# Patient Record
Sex: MALE | Race: WHITE | NOT HISPANIC OR LATINO
[De-identification: names, ages, dates, MRNs, and addresses within clinical notes are randomized per-mention and may not be internally consistent; named-entity substitution may affect disease eponyms.]

---

## 2017-02-17 ENCOUNTER — RX ONLY (RX ONLY)
Age: 32
End: 2017-02-17

## 2017-02-17 ENCOUNTER — APPOINTMENT (OUTPATIENT)
Age: 32
Setting detail: DERMATOLOGY
End: 2017-02-17

## 2017-02-17 DIAGNOSIS — L738 OTHER SPECIFIED DISEASES OF HAIR AND HAIR FOLLICLES: ICD-10-CM

## 2017-02-17 DIAGNOSIS — L70.0 ACNE VULGARIS: ICD-10-CM

## 2017-02-17 DIAGNOSIS — L663 OTHER SPECIFIED DISEASES OF HAIR AND HAIR FOLLICLES: ICD-10-CM

## 2017-02-17 PROBLEM — L02.12 FURUNCLE OF NECK: Status: ACTIVE | Noted: 2017-02-17

## 2017-02-17 PROBLEM — L02.02 FURUNCLE OF FACE: Status: ACTIVE | Noted: 2017-02-17

## 2017-02-17 PROCEDURE — OTHER COUNSELING: OTHER

## 2017-02-17 PROCEDURE — OTHER PRESCRIPTION: OTHER

## 2017-02-17 PROCEDURE — 99202 OFFICE O/P NEW SF 15 MIN: CPT

## 2017-02-17 RX ORDER — CLINDAMYCIN PHOSPHATE 10 MG/ML
LOTION TOPICAL QD
Qty: 1 | Refills: 1 | Status: ERX

## 2017-02-17 RX ORDER — DOXYCYCLINE HYCLATE 100 MG/1
1 TABLET CAPSULE, GELATIN COATED ORAL DAILY
Qty: 30 | Refills: 0 | Status: ERX

## 2017-02-17 ASSESSMENT — LOCATION DETAILED DESCRIPTION DERM
LOCATION DETAILED: RIGHT INFERIOR LATERAL NECK
LOCATION DETAILED: LEFT INFERIOR CENTRAL MALAR CHEEK
LOCATION DETAILED: SUBMENTAL CHIN
LOCATION DETAILED: LEFT INFERIOR LATERAL NECK
LOCATION DETAILED: RIGHT CHIN
LOCATION DETAILED: RIGHT SUPERIOR ANTERIOR NECK
LOCATION DETAILED: RIGHT INFERIOR CENTRAL MALAR CHEEK

## 2017-02-17 ASSESSMENT — LOCATION SIMPLE DESCRIPTION DERM
LOCATION SIMPLE: LEFT CHEEK
LOCATION SIMPLE: SUBMENTAL CHIN
LOCATION SIMPLE: RIGHT ANTERIOR NECK
LOCATION SIMPLE: RIGHT CHEEK
LOCATION SIMPLE: CHIN
LOCATION SIMPLE: LEFT ANTERIOR NECK

## 2017-02-17 ASSESSMENT — LOCATION ZONE DERM
LOCATION ZONE: NECK
LOCATION ZONE: FACE

## 2017-02-17 NOTE — HPI: BUMPS
How Severe Are Your Bumps?: mild
Have Your Bumps Been Treated?: not been treated
Is This A New Presentation, Or A Follow-Up?: Bumps
Additional History: Patient is not currently shaving or using a facial cleanser due to irritation.

## 2017-03-17 ENCOUNTER — APPOINTMENT (OUTPATIENT)
Age: 32
Setting detail: DERMATOLOGY
End: 2017-03-17

## 2017-03-17 DIAGNOSIS — L738 OTHER SPECIFIED DISEASES OF HAIR AND HAIR FOLLICLES: ICD-10-CM

## 2017-03-17 DIAGNOSIS — L70.0 ACNE VULGARIS: ICD-10-CM

## 2017-03-17 DIAGNOSIS — L81.0 POSTINFLAMMATORY HYPERPIGMENTATION: ICD-10-CM

## 2017-03-17 DIAGNOSIS — L663 OTHER SPECIFIED DISEASES OF HAIR AND HAIR FOLLICLES: ICD-10-CM

## 2017-03-17 PROBLEM — L02.12 FURUNCLE OF NECK: Status: ACTIVE | Noted: 2017-03-17

## 2017-03-17 PROBLEM — L02.02 FURUNCLE OF FACE: Status: ACTIVE | Noted: 2017-03-17

## 2017-03-17 PROCEDURE — OTHER PRESCRIPTION: OTHER

## 2017-03-17 PROCEDURE — OTHER PRESCRIPTION MEDICATION MANAGEMENT: OTHER

## 2017-03-17 PROCEDURE — 99213 OFFICE O/P EST LOW 20 MIN: CPT

## 2017-03-17 PROCEDURE — OTHER COUNSELING: OTHER

## 2017-03-17 RX ORDER — CLINDAMYCIN PHOSPHATE 10 MG/ML
LOTION TOPICAL QD
Qty: 1 | Refills: 5 | Status: ERX

## 2017-03-17 RX ORDER — DOXYCYCLINE HYCLATE 100 MG/1
1 TABLET CAPSULE, GELATIN COATED ORAL DAILY
Qty: 30 | Refills: 0 | Status: ERX

## 2017-03-17 ASSESSMENT — LOCATION SIMPLE DESCRIPTION DERM
LOCATION SIMPLE: RIGHT CHEEK
LOCATION SIMPLE: RIGHT ANTERIOR NECK
LOCATION SIMPLE: LEFT ANTERIOR NECK
LOCATION SIMPLE: LEFT CHEEK
LOCATION SIMPLE: SUBMENTAL CHIN
LOCATION SIMPLE: CHIN

## 2017-03-17 ASSESSMENT — LOCATION DETAILED DESCRIPTION DERM
LOCATION DETAILED: LEFT INFERIOR CENTRAL MALAR CHEEK
LOCATION DETAILED: RIGHT SUPERIOR ANTERIOR NECK
LOCATION DETAILED: SUBMENTAL CHIN
LOCATION DETAILED: RIGHT INFERIOR LATERAL NECK
LOCATION DETAILED: LEFT INFERIOR LATERAL NECK
LOCATION DETAILED: RIGHT INFERIOR CENTRAL MALAR CHEEK
LOCATION DETAILED: RIGHT CHIN

## 2017-03-17 ASSESSMENT — LOCATION ZONE DERM
LOCATION ZONE: FACE
LOCATION ZONE: NECK

## 2017-03-17 NOTE — PROCEDURE: PRESCRIPTION MEDICATION MANAGEMENT
Continue Regimen: Doxycycline x 1 more month then discontinue\\nClindamycin lotion daily
Otc Regimen: Head and shoulders in beard\\nRecommend gentle moisturizer in beard twice daily
Detail Level: Zone

## 2017-12-29 ENCOUNTER — APPOINTMENT (OUTPATIENT)
Age: 32
Setting detail: DERMATOLOGY
End: 2018-01-05

## 2017-12-29 ENCOUNTER — RX ONLY (RX ONLY)
Age: 32
End: 2017-12-29

## 2017-12-29 DIAGNOSIS — L738 OTHER SPECIFIED DISEASES OF HAIR AND HAIR FOLLICLES: ICD-10-CM

## 2017-12-29 DIAGNOSIS — L663 OTHER SPECIFIED DISEASES OF HAIR AND HAIR FOLLICLES: ICD-10-CM

## 2017-12-29 PROBLEM — L02.02 FURUNCLE OF FACE: Status: ACTIVE | Noted: 2017-12-29

## 2017-12-29 PROBLEM — L02.12 FURUNCLE OF NECK: Status: ACTIVE | Noted: 2017-12-29

## 2017-12-29 PROCEDURE — OTHER COUNSELING: OTHER

## 2017-12-29 PROCEDURE — OTHER PRESCRIPTION MEDICATION MANAGEMENT: OTHER

## 2017-12-29 PROCEDURE — OTHER PRESCRIPTION: OTHER

## 2017-12-29 PROCEDURE — 99213 OFFICE O/P EST LOW 20 MIN: CPT

## 2017-12-29 RX ORDER — CLINDAMYCIN PHOSPHATE 10 MG/ML
LOTION TOPICAL QD
Qty: 1 | Refills: 5 | Status: ERX

## 2017-12-29 RX ORDER — DOXYCYCLINE HYCLATE 100 MG/1
TABLET, COATED ORAL
Qty: 60 | Refills: 0 | Status: ERX

## 2017-12-29 ASSESSMENT — LOCATION ZONE DERM
LOCATION ZONE: FACE
LOCATION ZONE: NECK

## 2017-12-29 ASSESSMENT — LOCATION SIMPLE DESCRIPTION DERM
LOCATION SIMPLE: CHIN
LOCATION SIMPLE: LEFT ANTERIOR NECK
LOCATION SIMPLE: LEFT CHEEK
LOCATION SIMPLE: SUBMENTAL CHIN
LOCATION SIMPLE: RIGHT CHEEK
LOCATION SIMPLE: RIGHT ANTERIOR NECK

## 2017-12-29 ASSESSMENT — LOCATION DETAILED DESCRIPTION DERM
LOCATION DETAILED: RIGHT INFERIOR CENTRAL MALAR CHEEK
LOCATION DETAILED: RIGHT SUPERIOR ANTERIOR NECK
LOCATION DETAILED: SUBMENTAL CHIN
LOCATION DETAILED: LEFT INFERIOR LATERAL NECK
LOCATION DETAILED: RIGHT INFERIOR LATERAL NECK
LOCATION DETAILED: LEFT INFERIOR CENTRAL MALAR CHEEK
LOCATION DETAILED: RIGHT CHIN

## 2017-12-29 NOTE — PROCEDURE: PRESCRIPTION MEDICATION MANAGEMENT
Initiate Treatment: Doxycycline 100 mgs twice daily
Continue Regimen: Clindamycin lotion
Detail Level: Simple
Otc Regimen: BPO wash

## 2018-02-02 ENCOUNTER — APPOINTMENT (OUTPATIENT)
Age: 33
Setting detail: DERMATOLOGY
End: 2018-02-06

## 2018-02-02 DIAGNOSIS — L738 OTHER SPECIFIED DISEASES OF HAIR AND HAIR FOLLICLES: ICD-10-CM

## 2018-02-02 DIAGNOSIS — L663 OTHER SPECIFIED DISEASES OF HAIR AND HAIR FOLLICLES: ICD-10-CM

## 2018-02-02 PROBLEM — L02.02 FURUNCLE OF FACE: Status: ACTIVE | Noted: 2018-02-02

## 2018-02-02 PROBLEM — L02.12 FURUNCLE OF NECK: Status: ACTIVE | Noted: 2018-02-02

## 2018-02-02 PROCEDURE — OTHER COUNSELING: OTHER

## 2018-02-02 PROCEDURE — OTHER PRESCRIPTION MEDICATION MANAGEMENT: OTHER

## 2018-02-02 PROCEDURE — OTHER PRESCRIPTION: OTHER

## 2018-02-02 PROCEDURE — 99213 OFFICE O/P EST LOW 20 MIN: CPT

## 2018-02-02 RX ORDER — DOXYCYCLINE HYCLATE 100 MG/1
TABLET, COATED ORAL
Qty: 60 | Refills: 1 | Status: ERX

## 2018-02-02 ASSESSMENT — LOCATION ZONE DERM
LOCATION ZONE: NECK
LOCATION ZONE: FACE

## 2018-02-02 ASSESSMENT — LOCATION SIMPLE DESCRIPTION DERM
LOCATION SIMPLE: SUBMENTAL CHIN
LOCATION SIMPLE: LEFT ANTERIOR NECK
LOCATION SIMPLE: RIGHT CHEEK
LOCATION SIMPLE: LEFT CHEEK
LOCATION SIMPLE: RIGHT ANTERIOR NECK
LOCATION SIMPLE: CHIN

## 2018-02-02 ASSESSMENT — LOCATION DETAILED DESCRIPTION DERM
LOCATION DETAILED: RIGHT SUPERIOR ANTERIOR NECK
LOCATION DETAILED: RIGHT INFERIOR CENTRAL MALAR CHEEK
LOCATION DETAILED: SUBMENTAL CHIN
LOCATION DETAILED: LEFT INFERIOR CENTRAL MALAR CHEEK
LOCATION DETAILED: LEFT INFERIOR LATERAL NECK
LOCATION DETAILED: RIGHT INFERIOR LATERAL NECK
LOCATION DETAILED: RIGHT CHIN

## 2018-02-02 NOTE — PROCEDURE: PRESCRIPTION MEDICATION MANAGEMENT
Continue Regimen: Doxycycline 100 mg BID\\nClindamycin lotion
Plan: 2 more months of doxycycline \\n3 month f/u\\nContinue clindamycin lotion daily
Detail Level: Simple

## 2018-10-01 ENCOUNTER — APPOINTMENT (OUTPATIENT)
Age: 33
Setting detail: DERMATOLOGY
End: 2018-10-04

## 2018-10-01 DIAGNOSIS — L663 OTHER SPECIFIED DISEASES OF HAIR AND HAIR FOLLICLES: ICD-10-CM

## 2018-10-01 DIAGNOSIS — L738 OTHER SPECIFIED DISEASES OF HAIR AND HAIR FOLLICLES: ICD-10-CM

## 2018-10-01 DIAGNOSIS — B35.0 TINEA BARBAE AND TINEA CAPITIS: ICD-10-CM

## 2018-10-01 PROBLEM — L02.12 FURUNCLE OF NECK: Status: ACTIVE | Noted: 2018-10-01

## 2018-10-01 PROBLEM — L02.02 FURUNCLE OF FACE: Status: ACTIVE | Noted: 2018-10-01

## 2018-10-01 PROCEDURE — OTHER COUNSELING: OTHER

## 2018-10-01 PROCEDURE — OTHER PRESCRIPTION: OTHER

## 2018-10-01 PROCEDURE — OTHER ADDITIONAL NOTES: OTHER

## 2018-10-01 PROCEDURE — 99213 OFFICE O/P EST LOW 20 MIN: CPT

## 2018-10-01 RX ORDER — KETOCONAZOLE 20 MG/ML
SHAMPOO TOPICAL
Qty: 1 | Refills: 0 | Status: ERX

## 2018-10-01 RX ORDER — CLINDAMYCIN PHOSPHATE 10 MG/ML
LOTION TOPICAL
Qty: 1 | Refills: 3 | Status: ERX

## 2018-10-01 ASSESSMENT — LOCATION DETAILED DESCRIPTION DERM
LOCATION DETAILED: LEFT SUPERIOR LATERAL NECK
LOCATION DETAILED: RIGHT SUPERIOR ANTERIOR NECK
LOCATION DETAILED: LEFT SUPERIOR MEDIAL BUCCAL CHEEK
LOCATION DETAILED: LEFT INFERIOR CENTRAL MALAR CHEEK
LOCATION DETAILED: RIGHT SUPERIOR LATERAL BUCCAL CHEEK
LOCATION DETAILED: LEFT INFERIOR LATERAL NECK
LOCATION DETAILED: RIGHT CHIN
LOCATION DETAILED: RIGHT INFERIOR CENTRAL MALAR CHEEK
LOCATION DETAILED: SUBMENTAL CHIN
LOCATION DETAILED: RIGHT INFERIOR LATERAL NECK

## 2018-10-01 ASSESSMENT — LOCATION SIMPLE DESCRIPTION DERM
LOCATION SIMPLE: LEFT ANTERIOR NECK
LOCATION SIMPLE: CHIN
LOCATION SIMPLE: SUBMENTAL CHIN
LOCATION SIMPLE: LEFT CHEEK
LOCATION SIMPLE: RIGHT ANTERIOR NECK
LOCATION SIMPLE: RIGHT CHEEK

## 2018-10-01 ASSESSMENT — LOCATION ZONE DERM
LOCATION ZONE: NECK
LOCATION ZONE: FACE

## 2018-10-01 NOTE — PROCEDURE: ADDITIONAL NOTES
Detail Level: Simple
Additional Notes: possible tinea due to ongoing nature of inflammation and pustules. temporarily resolves with oral antibiotics and topical antibiotics. unable to tolerate BPO or retinoids.\\nobtained fungal culture today and consider treatment with oral antifungal at follow-up if culture postive

## 2018-10-31 ENCOUNTER — APPOINTMENT (OUTPATIENT)
Age: 33
Setting detail: DERMATOLOGY
End: 2018-11-01

## 2018-10-31 DIAGNOSIS — L663 OTHER SPECIFIED DISEASES OF HAIR AND HAIR FOLLICLES: ICD-10-CM

## 2018-10-31 DIAGNOSIS — L738 OTHER SPECIFIED DISEASES OF HAIR AND HAIR FOLLICLES: ICD-10-CM

## 2018-10-31 PROBLEM — L02.12 FURUNCLE OF NECK: Status: ACTIVE | Noted: 2018-10-31

## 2018-10-31 PROBLEM — L02.02 FURUNCLE OF FACE: Status: ACTIVE | Noted: 2018-10-31

## 2018-10-31 PROCEDURE — OTHER PRESCRIPTION: OTHER

## 2018-10-31 PROCEDURE — OTHER COUNSELING: OTHER

## 2018-10-31 PROCEDURE — OTHER PRESCRIPTION MEDICATION MANAGEMENT: OTHER

## 2018-10-31 PROCEDURE — 99213 OFFICE O/P EST LOW 20 MIN: CPT

## 2018-10-31 RX ORDER — KETOCONAZOLE 20.5 MG/ML
SHAMPOO, SUSPENSION TOPICAL
Qty: 1 | Refills: 2 | Status: ERX

## 2018-10-31 RX ORDER — DOXYCYCLINE HYCLATE 50 MG/1
CAPSULE, GELATIN COATED ORAL
Qty: 60 | Refills: 0 | Status: ERX

## 2018-10-31 ASSESSMENT — LOCATION DETAILED DESCRIPTION DERM
LOCATION DETAILED: LEFT SUPERIOR LATERAL BUCCAL CHEEK
LOCATION DETAILED: RIGHT SUPERIOR LATERAL BUCCAL CHEEK
LOCATION DETAILED: LEFT SUPERIOR ANTERIOR NECK

## 2018-10-31 ASSESSMENT — LOCATION ZONE DERM
LOCATION ZONE: FACE
LOCATION ZONE: NECK

## 2018-10-31 ASSESSMENT — LOCATION SIMPLE DESCRIPTION DERM
LOCATION SIMPLE: LEFT CHEEK
LOCATION SIMPLE: RIGHT CHEEK
LOCATION SIMPLE: LEFT ANTERIOR NECK

## 2018-12-12 ENCOUNTER — APPOINTMENT (OUTPATIENT)
Age: 33
Setting detail: DERMATOLOGY
End: 2018-12-15

## 2018-12-12 DIAGNOSIS — L70.0 ACNE VULGARIS: ICD-10-CM

## 2018-12-12 DIAGNOSIS — L738 OTHER SPECIFIED DISEASES OF HAIR AND HAIR FOLLICLES: ICD-10-CM

## 2018-12-12 DIAGNOSIS — L663 OTHER SPECIFIED DISEASES OF HAIR AND HAIR FOLLICLES: ICD-10-CM

## 2018-12-12 PROBLEM — L02.02 FURUNCLE OF FACE: Status: ACTIVE | Noted: 2018-12-12

## 2018-12-12 PROBLEM — L02.12 FURUNCLE OF NECK: Status: ACTIVE | Noted: 2018-12-12

## 2018-12-12 PROCEDURE — 99213 OFFICE O/P EST LOW 20 MIN: CPT

## 2018-12-12 PROCEDURE — OTHER PRESCRIPTION MEDICATION MANAGEMENT: OTHER

## 2018-12-12 PROCEDURE — OTHER PRESCRIPTION: OTHER

## 2018-12-12 PROCEDURE — OTHER COUNSELING: OTHER

## 2018-12-12 RX ORDER — ADAPALENE AND BENZOYL PEROXIDE 3; 25 MG/G; MG/G
GEL TOPICAL
Qty: 1 | Refills: 3 | Status: ERX

## 2018-12-12 ASSESSMENT — LOCATION DETAILED DESCRIPTION DERM
LOCATION DETAILED: LEFT SUPERIOR ANTERIOR NECK
LOCATION DETAILED: LEFT SUPERIOR LATERAL BUCCAL CHEEK
LOCATION DETAILED: RIGHT SUPERIOR LATERAL BUCCAL CHEEK

## 2018-12-12 ASSESSMENT — LOCATION SIMPLE DESCRIPTION DERM
LOCATION SIMPLE: LEFT ANTERIOR NECK
LOCATION SIMPLE: LEFT CHEEK
LOCATION SIMPLE: RIGHT CHEEK

## 2018-12-12 ASSESSMENT — LOCATION ZONE DERM
LOCATION ZONE: NECK
LOCATION ZONE: FACE

## 2018-12-12 NOTE — PROCEDURE: COUNSELING
Birth Control Pills Counseling: Birth Control Pill Counseling: I discussed with the patient the potential side effects of OCPs including but not limited to increased risk of stroke, heart attack, thrombophlebitis, deep venous thrombosis, hepatic adenomas, breast changes, GI upset, headaches, and depression.  The patient verbalized understanding of the proper use and possible adverse effects of OCPs. All of the patient's questions and concerns were addressed.
Bactrim Counseling:  I discussed with the patient the risks of sulfa antibiotics including but not limited to GI upset, allergic reaction, drug rash, diarrhea, dizziness, photosensitivity, and yeast infections.  Rarely, more serious reactions can occur including but not limited to aplastic anemia, agranulocytosis, methemoglobinemia, blood dyscrasias, liver or kidney failure, lung infiltrates or desquamative/blistering drug rashes.
Doxycycline Counseling:  Patient counseled regarding possible photosensitivity and increased risk for sunburn.  Patient instructed to avoid sunlight, if possible.  When exposed to sunlight, patients should wear protective clothing, sunglasses, and sunscreen.  The patient was instructed to call the office immediately if the following severe adverse effects occur:  hearing changes, easy bruising/bleeding, severe headache, or vision changes.  The patient verbalized understanding of the proper use and possible adverse effects of doxycycline.  All of the patient's questions and concerns were addressed.
High Dose Vitamin A Counseling: Side effects reviewed, pt to contact office should one occur.
Birth Control Pills Pregnancy And Lactation Text: This medication should be avoided if pregnant and for the first 30 days post-partum.
Detail Level: Zone
Topical Retinoid counseling:  Patient advised to apply a pea-sized amount only at bedtime and wait 30 minutes after washing their face before applying.  If too drying, patient may add a non-comedogenic moisturizer. The patient verbalized understanding of the proper use and possible adverse effects of retinoids.  All of the patient's questions and concerns were addressed.
Use Enhanced Medication Counseling?: No
Topical Clindamycin Pregnancy And Lactation Text: This medication is Pregnancy Category B and is considered safe during pregnancy. It is unknown if it is excreted in breast milk.
Spironolactone Pregnancy And Lactation Text: This medication can cause feminization of the male fetus and should be avoided during pregnancy. The active metabolite is also found in breast milk.
Dapsone Pregnancy And Lactation Text: This medication is Pregnancy Category C and is not considered safe during pregnancy or breast feeding.
Doxycycline Pregnancy And Lactation Text: This medication is Pregnancy Category D and not consider safe during pregnancy. It is also excreted in breast milk but is considered safe for shorter treatment courses.
Minocycline Counseling: Patient advised regarding possible photosensitivity and discoloration of the teeth, skin, lips, tongue and gums.  Patient instructed to avoid sunlight, if possible.  When exposed to sunlight, patients should wear protective clothing, sunglasses, and sunscreen.  The patient was instructed to call the office immediately if the following severe adverse effects occur:  hearing changes, easy bruising/bleeding, severe headache, or vision changes.  The patient verbalized understanding of the proper use and possible adverse effects of minocycline.  All of the patient's questions and concerns were addressed.
Dapsone Counseling: I discussed with the patient the risks of dapsone including but not limited to hemolytic anemia, agranulocytosis, rashes, methemoglobinemia, kidney failure, peripheral neuropathy, headaches, GI upset, and liver toxicity.  Patients who start dapsone require monitoring including baseline LFTs and weekly CBCs for the first month, then every month thereafter.  The patient verbalized understanding of the proper use and possible adverse effects of dapsone.  All of the patient's questions and concerns were addressed.
Tetracycline Pregnancy And Lactation Text: This medication is Pregnancy Category D and not consider safe during pregnancy. It is also excreted in breast milk.
Erythromycin Pregnancy And Lactation Text: This medication is Pregnancy Category B and is considered safe during pregnancy. It is also excreted in breast milk.
Spironolactone Counseling: Patient advised regarding risks of diarrhea, abdominal pain, hyperkalemia, birth defects (for female patients), liver toxicity and renal toxicity. The patient may need blood work to monitor liver and kidney function and potassium levels while on therapy. The patient verbalized understanding of the proper use and possible adverse effects of spironolactone.  All of the patient's questions and concerns were addressed.
Topical Retinoid Pregnancy And Lactation Text: This medication is Pregnancy Category C. It is unknown if this medication is excreted in breast milk.
Azithromycin Pregnancy And Lactation Text: This medication is considered safe during pregnancy and is also secreted in breast milk.
Tazorac Counseling:  Patient advised that medication is irritating and drying.  Patient may need to apply sparingly and wash off after an hour before eventually leaving it on overnight.  The patient verbalized understanding of the proper use and possible adverse effects of tazorac.  All of the patient's questions and concerns were addressed.
Azithromycin Counseling:  I discussed with the patient the risks of azithromycin including but not limited to GI upset, allergic reaction, drug rash, diarrhea, and yeast infections.
Bactrim Pregnancy And Lactation Text: This medication is Pregnancy Category D and is known to cause fetal risk.  It is also excreted in breast milk.
Erythromycin Counseling:  I discussed with the patient the risks of erythromycin including but not limited to GI upset, allergic reaction, drug rash, diarrhea, increase in liver enzymes, and yeast infections.
Topical Clindamycin Counseling: Patient counseled that this medication may cause skin irritation or allergic reactions.  In the event of skin irritation, the patient was advised to reduce the amount of the drug applied or use it less frequently.   The patient verbalized understanding of the proper use and possible adverse effects of clindamycin.  All of the patient's questions and concerns were addressed.
Topical Sulfur Applications Counseling: Topical Sulfur Counseling: Patient counseled that this medication may cause skin irritation or allergic reactions.  In the event of skin irritation, the patient was advised to reduce the amount of the drug applied or use it less frequently.   The patient verbalized understanding of the proper use and possible adverse effects of topical sulfur application.  All of the patient's questions and concerns were addressed.
Benzoyl Peroxide Counseling: Patient counseled that medicine may cause skin irritation and bleach clothing.  In the event of skin irritation, the patient was advised to reduce the amount of the drug applied or use it less frequently.   The patient verbalized understanding of the proper use and possible adverse effects of benzoyl peroxide.  All of the patient's questions and concerns were addressed.
Benzoyl Peroxide Pregnancy And Lactation Text: This medication is Pregnancy Category C. It is unknown if benzoyl peroxide is excreted in breast milk.
Topical Sulfur Applications Pregnancy And Lactation Text: This medication is Pregnancy Category C and has an unknown safety profile during pregnancy. It is unknown if this topical medication is excreted in breast milk.
High Dose Vitamin A Pregnancy And Lactation Text: High dose vitamin A therapy is contraindicated during pregnancy and breast feeding.
Tetracycline Counseling: Patient counseled regarding possible photosensitivity and increased risk for sunburn.  Patient instructed to avoid sunlight, if possible.  When exposed to sunlight, patients should wear protective clothing, sunglasses, and sunscreen.  The patient was instructed to call the office immediately if the following severe adverse effects occur:  hearing changes, easy bruising/bleeding, severe headache, or vision changes.  The patient verbalized understanding of the proper use and possible adverse effects of tetracycline.  All of the patient's questions and concerns were addressed. Patient understands to avoid pregnancy while on therapy due to potential birth defects.
Isotretinoin Counseling: Patient should get monthly blood tests, not donate blood, not drive at night if vision affected, not share medication, and not undergo elective surgery for 6 months after tx completed. Side effects reviewed, pt to contact office should one occur.
Tazorac Pregnancy And Lactation Text: This medication is not safe during pregnancy. It is unknown if this medication is excreted in breast milk.
Isotretinoin Pregnancy And Lactation Text: This medication is Pregnancy Category X and is considered extremely dangerous during pregnancy. It is unknown if it is excreted in breast milk.

## 2018-12-12 NOTE — PROCEDURE: PRESCRIPTION MEDICATION MANAGEMENT
Detail Level: Detailed
Continue Regimen: Ketoconazole shampoo and clinda lotion
Plan: Papules and pustules as well as inflammation responded well to 1 month oral antibiotic. Few pustules remaining today. will treat as acne and allow clinda, bpo, and 2 x a week ketoconazole to control follicular component.
Render In Strict Bullet Format?: No
Continue Regimen: Clindamycin lotion
Initiate Treatment: Epiduo forte every other night increasing to nightly as tolerated
Discontinue Regimen: BPO wash

## 2019-08-02 ENCOUNTER — APPOINTMENT (OUTPATIENT)
Age: 34
Setting detail: DERMATOLOGY
End: 2019-08-02

## 2019-08-02 DIAGNOSIS — L738 OTHER SPECIFIED DISEASES OF HAIR AND HAIR FOLLICLES: ICD-10-CM

## 2019-08-02 DIAGNOSIS — L663 OTHER SPECIFIED DISEASES OF HAIR AND HAIR FOLLICLES: ICD-10-CM

## 2019-08-02 PROBLEM — L02.02 FURUNCLE OF FACE: Status: ACTIVE | Noted: 2019-08-02

## 2019-08-02 PROBLEM — L02.12 FURUNCLE OF NECK: Status: ACTIVE | Noted: 2019-08-02

## 2019-08-02 PROCEDURE — OTHER PRESCRIPTION: OTHER

## 2019-08-02 PROCEDURE — OTHER PRESCRIPTION MEDICATION MANAGEMENT: OTHER

## 2019-08-02 PROCEDURE — 99213 OFFICE O/P EST LOW 20 MIN: CPT

## 2019-08-02 PROCEDURE — OTHER ADDITIONAL NOTES: OTHER

## 2019-08-02 PROCEDURE — OTHER ORDER TESTS: OTHER

## 2019-08-02 PROCEDURE — OTHER COUNSELING: OTHER

## 2019-08-02 RX ORDER — CLINDAMYCIN PHOSPHATE 10 MG/ML
SOLUTION TOPICAL
Qty: 2 | Refills: 0 | Status: ERX | COMMUNITY
Start: 2019-08-02

## 2019-08-02 ASSESSMENT — LOCATION DETAILED DESCRIPTION DERM
LOCATION DETAILED: RIGHT SUPERIOR LATERAL BUCCAL CHEEK
LOCATION DETAILED: LEFT INFERIOR MEDIAL BUCCAL CHEEK
LOCATION DETAILED: LEFT SUPERIOR ANTERIOR NECK
LOCATION DETAILED: LEFT SUPERIOR LATERAL BUCCAL CHEEK

## 2019-08-02 ASSESSMENT — LOCATION ZONE DERM
LOCATION ZONE: NECK
LOCATION ZONE: FACE

## 2019-08-02 ASSESSMENT — LOCATION SIMPLE DESCRIPTION DERM
LOCATION SIMPLE: LEFT ANTERIOR NECK
LOCATION SIMPLE: LEFT CHEEK
LOCATION SIMPLE: RIGHT CHEEK

## 2019-08-02 NOTE — PROCEDURE: PRESCRIPTION MEDICATION MANAGEMENT
Plan: Bacterial culture done today.  He had a negative fungal culture in the past.  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  He tries to use an electric shaver.  He reports doxy has worked well in the past.  Will re-start topical clindamycin as long term oral antibiotics are not recommended and bpo wash and re-evaluate based on culture results. Plan: Bacterial culture done today.  He had a negative fungal culture in the past.  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  He tries to use an electric shaver.  He reports doxy has worked well in the past.  Will re-start topical clindamycin as long term oral antibiotics are not recommended and bpo wash and re-evaluate based on culture results.

## 2019-08-13 ENCOUNTER — RX ONLY (RX ONLY)
Age: 34
End: 2019-08-13

## 2019-08-13 RX ORDER — CEFDINIR 300 MG/1
CAPSULE ORAL
Qty: 20 | Refills: 0 | Status: ERX | COMMUNITY
Start: 2019-08-13

## 2019-08-20 ENCOUNTER — APPOINTMENT (OUTPATIENT)
Age: 34
Setting detail: DERMATOLOGY
End: 2019-08-20

## 2019-08-20 ENCOUNTER — RX ONLY (RX ONLY)
Age: 34
End: 2019-08-20

## 2019-08-20 DIAGNOSIS — L663 OTHER SPECIFIED DISEASES OF HAIR AND HAIR FOLLICLES: ICD-10-CM

## 2019-08-20 DIAGNOSIS — L738 OTHER SPECIFIED DISEASES OF HAIR AND HAIR FOLLICLES: ICD-10-CM

## 2019-08-20 PROBLEM — L02.02 FURUNCLE OF FACE: Status: ACTIVE | Noted: 2019-08-20

## 2019-08-20 PROBLEM — L02.12 FURUNCLE OF NECK: Status: ACTIVE | Noted: 2019-08-20

## 2019-08-20 PROCEDURE — 99213 OFFICE O/P EST LOW 20 MIN: CPT

## 2019-08-20 PROCEDURE — OTHER PRESCRIPTION MEDICATION MANAGEMENT: OTHER

## 2019-08-20 PROCEDURE — OTHER COUNSELING: OTHER

## 2019-08-20 RX ORDER — CLINDAMYCIN PHOSPHATE 10 MG/ML
SOLUTION TOPICAL
Qty: 2 | Refills: 2 | Status: ERX | COMMUNITY
Start: 2019-08-20

## 2019-08-20 ASSESSMENT — SEVERITY ASSESSMENT: SEVERITY: ALMOST CLEAR

## 2019-08-20 ASSESSMENT — LOCATION DETAILED DESCRIPTION DERM
LOCATION DETAILED: RIGHT SUPERIOR LATERAL BUCCAL CHEEK
LOCATION DETAILED: LEFT SUPERIOR ANTERIOR NECK
LOCATION DETAILED: LEFT SUPERIOR LATERAL BUCCAL CHEEK

## 2019-08-20 ASSESSMENT — LOCATION SIMPLE DESCRIPTION DERM
LOCATION SIMPLE: RIGHT CHEEK
LOCATION SIMPLE: LEFT CHEEK
LOCATION SIMPLE: LEFT ANTERIOR NECK

## 2019-08-20 ASSESSMENT — LOCATION ZONE DERM
LOCATION ZONE: FACE
LOCATION ZONE: NECK

## 2019-08-20 NOTE — PROCEDURE: PRESCRIPTION MEDICATION MANAGEMENT
Continue Regimen: clindamycin\\nCefdinir
Detail Level: Zone
Otc Regimen: BPO wash daily
Plan: Reviewed + culture with pt.
Render In Strict Bullet Format?: No

## 2019-10-01 ENCOUNTER — APPOINTMENT (OUTPATIENT)
Age: 34
Setting detail: DERMATOLOGY
End: 2019-10-01

## 2019-10-01 DIAGNOSIS — L663 OTHER SPECIFIED DISEASES OF HAIR AND HAIR FOLLICLES: ICD-10-CM

## 2019-10-01 DIAGNOSIS — L738 OTHER SPECIFIED DISEASES OF HAIR AND HAIR FOLLICLES: ICD-10-CM

## 2019-10-01 PROBLEM — L02.02 FURUNCLE OF FACE: Status: ACTIVE | Noted: 2019-10-01

## 2019-10-01 PROBLEM — L02.12 FURUNCLE OF NECK: Status: ACTIVE | Noted: 2019-10-01

## 2019-10-01 PROCEDURE — 99213 OFFICE O/P EST LOW 20 MIN: CPT

## 2019-10-01 PROCEDURE — OTHER COUNSELING: OTHER

## 2019-10-01 PROCEDURE — OTHER PRESCRIPTION: OTHER

## 2019-10-01 PROCEDURE — OTHER PRESCRIPTION MEDICATION MANAGEMENT: OTHER

## 2019-10-01 RX ORDER — CLINDAMYCIN PHOSPHATE 10 MG/ML
SOLUTION TOPICAL
Qty: 1 | Refills: 5 | Status: ERX

## 2019-10-01 ASSESSMENT — LOCATION ZONE DERM
LOCATION ZONE: FACE
LOCATION ZONE: NECK

## 2019-10-01 ASSESSMENT — LOCATION SIMPLE DESCRIPTION DERM
LOCATION SIMPLE: LEFT CHEEK
LOCATION SIMPLE: LEFT ANTERIOR NECK
LOCATION SIMPLE: RIGHT CHEEK

## 2019-10-01 ASSESSMENT — LOCATION DETAILED DESCRIPTION DERM
LOCATION DETAILED: LEFT SUPERIOR ANTERIOR NECK
LOCATION DETAILED: RIGHT SUPERIOR LATERAL BUCCAL CHEEK
LOCATION DETAILED: LEFT SUPERIOR LATERAL BUCCAL CHEEK

## 2019-10-01 ASSESSMENT — SEVERITY ASSESSMENT: SEVERITY: CLEAR

## 2019-10-01 NOTE — PROCEDURE: PRESCRIPTION MEDICATION MANAGEMENT
Detail Level: Zone
Render In Strict Bullet Format?: No
Continue Regimen: clindamycin 1% solution daily as needed\\nBPO wash daily

## 2020-01-28 ENCOUNTER — APPOINTMENT (OUTPATIENT)
Dept: URBAN - METROPOLITAN AREA CLINIC 285 | Age: 35
Setting detail: DERMATOLOGY
End: 2020-01-28

## 2020-01-28 DIAGNOSIS — L663 OTHER SPECIFIED DISEASES OF HAIR AND HAIR FOLLICLES: ICD-10-CM

## 2020-01-28 DIAGNOSIS — L738 OTHER SPECIFIED DISEASES OF HAIR AND HAIR FOLLICLES: ICD-10-CM

## 2020-01-28 PROBLEM — L02.02 FURUNCLE OF FACE: Status: ACTIVE | Noted: 2020-01-28

## 2020-01-28 PROBLEM — L02.12 FURUNCLE OF NECK: Status: ACTIVE | Noted: 2020-01-28

## 2020-01-28 PROCEDURE — OTHER PRESCRIPTION: OTHER

## 2020-01-28 PROCEDURE — OTHER COUNSELING: OTHER

## 2020-01-28 PROCEDURE — 99213 OFFICE O/P EST LOW 20 MIN: CPT

## 2020-01-28 RX ORDER — CLINDAMYCIN PHOSPHATE 10 MG/ML
SOLUTION TOPICAL
Qty: 1 | Refills: 11 | Status: ERX | COMMUNITY
Start: 2020-01-28

## 2020-01-28 ASSESSMENT — LOCATION DETAILED DESCRIPTION DERM
LOCATION DETAILED: RIGHT SUPERIOR ANTERIOR NECK
LOCATION DETAILED: LEFT SUPERIOR MEDIAL BUCCAL CHEEK

## 2020-01-28 ASSESSMENT — SEVERITY ASSESSMENT: SEVERITY: CLEAR

## 2020-01-28 ASSESSMENT — LOCATION SIMPLE DESCRIPTION DERM
LOCATION SIMPLE: RIGHT ANTERIOR NECK
LOCATION SIMPLE: LEFT CHEEK

## 2020-01-28 ASSESSMENT — LOCATION ZONE DERM
LOCATION ZONE: FACE
LOCATION ZONE: NECK